# Patient Record
Sex: MALE | Race: WHITE | NOT HISPANIC OR LATINO | Employment: STUDENT | URBAN - METROPOLITAN AREA
[De-identification: names, ages, dates, MRNs, and addresses within clinical notes are randomized per-mention and may not be internally consistent; named-entity substitution may affect disease eponyms.]

---

## 2024-05-22 ENCOUNTER — OFFICE VISIT (OUTPATIENT)
Dept: GASTROENTEROLOGY | Facility: CLINIC | Age: 24
End: 2024-05-22
Payer: COMMERCIAL

## 2024-05-22 VITALS
HEART RATE: 63 BPM | OXYGEN SATURATION: 97 % | WEIGHT: 220 LBS | SYSTOLIC BLOOD PRESSURE: 123 MMHG | TEMPERATURE: 97.2 F | DIASTOLIC BLOOD PRESSURE: 78 MMHG

## 2024-05-22 DIAGNOSIS — K62.5 RECTAL BLEEDING: Primary | ICD-10-CM

## 2024-05-22 PROCEDURE — 99203 OFFICE O/P NEW LOW 30 MIN: CPT | Performed by: INTERNAL MEDICINE

## 2024-05-22 RX ORDER — SODIUM CHLORIDE FOR INHALATION 0.9 %
VIAL, NEBULIZER (ML) INHALATION
COMMUNITY
Start: 2024-05-03

## 2024-05-22 NOTE — PROGRESS NOTES
[facility] - Outpatient Consultation  Levy Phipps 23 y.o. male MRN: [id number]  Encounter: [id number]          ASSESSMENT AND PLAN:      1. Rectal bleeding  -No rectal bleeding for 6 months.  Therefore, no indication for either flexible sigmoidoscopy or colonoscopy at this time  -Patient was reassured that the most likely explanation for a temporary rectal bleeding at that time was hemorrhoids.  -High-fiber diet to include fruits, vegetables, and whole grain foods, daily  -Increase water intake up to 8 glasses/day.    I reassured the patient that having the desire to have a bowel movement 30 minutes after eating is a normal physiological response reflecting the gastrocolic reflex.    ______________________________________________________________________    HPI: Levy is a 23-year-old male who comes to the office today with complaint of rectal bleeding which occurred multiple times over 6 months ago.  He states that the rectal bleeding is stopped altogether with no recurring episodes in the past 6 months.  The rectal bleeding was both on the toilet tissue and a small amount in the toilet.  He states that he also has a tendency to eat and then within a half an hour he will need to go to the bathroom for bowel movement.  He currently denies any abdominal pain, nausea, vomiting, diarrhea, constipation, hiccups, belching, rectal bleeding, melena.  He sometimes experiences some bloating.  His father was diagnosed with diverticulosis complicated by diverticulitis.  He states that recently he started drinking more water and eating more fiber on a daily basis.      REVIEW OF SYSTEMS:    CONSTITUTIONAL: Denies any fever, chills, rigors, and weight loss.  HEENT: No earache or tinnitus. Denies hearing loss or visual disturbances.  CARDIOVASCULAR: No chest pain or palpitations.   RESPIRATORY: Denies any cough, hemoptysis, shortness of breath or dyspnea on exertion.  GASTROINTESTINAL: As noted in  the History of Present Illness.   GENITOURINARY: No problems with urination. Denies any hematuria or dysuria.  NEUROLOGIC: No dizziness or vertigo, denies headaches.   MUSCULOSKELETAL: Denies any muscle or joint pain.   SKIN: Denies skin rashes or itching.   ENDOCRINE: Denies excessive thirst. Denies intolerance to heat or cold.  PSYCHOSOCIAL: Denies depression or anxiety. Denies any recent memory loss.       Historical Information   Past Medical History:   Diagnosis Date    H/O eye surgery     left eye     History reviewed. No pertinent surgical history.  Social History   Social History     Substance and Sexual Activity   Alcohol Use Yes    Comment: socially     Social History     Substance and Sexual Activity   Drug Use Never     Social History     Tobacco Use   Smoking Status Never   Smokeless Tobacco Never     Family History   Problem Relation Age of Onset    No Known Problems Mother     No Known Problems Father     Diabetes Maternal Grandfather     No Known Problems Paternal Grandfather     Colon cancer Paternal Aunt     Breast cancer Maternal Great-Grandmother     Ulcerative colitis Maternal Great-Grandfather        Meds/Allergies       Current Outpatient Medications:     sodium chloride 0.9 % nebulizer solution    No Known Allergies        Objective     Blood pressure 123/78, pulse 63, temperature (!) 97.2 °F (36.2 °C), temperature source Temporal, weight 99.8 kg (220 lb), SpO2 97%. There is no height or weight on file to calculate BMI.        PHYSICAL EXAM:      General Appearance:   Alert, cooperative, no distress   HEENT:   Normocephalic, atraumatic, anicteric.     Neck:  Supple, symmetrical, trachea midline   Lungs:   Clear to auscultation bilaterally; no rales, rhonchi or wheezing; respirations unlabored    Heart::   Regular rate and rhythm; no murmur, rub, or gallop.   Abdomen:   Soft, non-tender, non-distended; normal bowel sounds; no masses, no organomegaly    Genitalia:   Deferred    Rectal:    Deferred    Extremities:  No cyanosis, clubbing or edema    Pulses:  2+ and symmetric    Skin:  No jaundice, rashes, or lesions    Lymph nodes:  No palpable cervical lymphadenopathy        Lab Results:   No visits with results within 1 Day(s) from this visit.   Latest known visit with results is:   No results found for any previous visit.         Radiology Results:   No results found.